# Patient Record
Sex: FEMALE | Race: BLACK OR AFRICAN AMERICAN | Employment: FULL TIME | URBAN - METROPOLITAN AREA
[De-identification: names, ages, dates, MRNs, and addresses within clinical notes are randomized per-mention and may not be internally consistent; named-entity substitution may affect disease eponyms.]

---

## 2017-02-09 ENCOUNTER — OFFICE VISIT (OUTPATIENT)
Dept: FAMILY MEDICINE CLINIC | Age: 62
End: 2017-02-09

## 2017-02-09 VITALS
OXYGEN SATURATION: 98 % | DIASTOLIC BLOOD PRESSURE: 87 MMHG | HEART RATE: 94 BPM | BODY MASS INDEX: 31.55 KG/M2 | WEIGHT: 201 LBS | RESPIRATION RATE: 20 BRPM | TEMPERATURE: 100.1 F | SYSTOLIC BLOOD PRESSURE: 143 MMHG | HEIGHT: 67 IN

## 2017-02-09 DIAGNOSIS — R68.83 CHILLS: ICD-10-CM

## 2017-02-09 DIAGNOSIS — R50.9 FEVER, UNSPECIFIED FEVER CAUSE: ICD-10-CM

## 2017-02-09 DIAGNOSIS — R05.9 COUGH: ICD-10-CM

## 2017-02-09 DIAGNOSIS — J02.9 SORE THROAT: ICD-10-CM

## 2017-02-09 DIAGNOSIS — J11.1 INFLUENZA: Primary | ICD-10-CM

## 2017-02-09 DIAGNOSIS — J30.9 ALLERGIC RHINITIS, UNSPECIFIED ALLERGIC RHINITIS TRIGGER, UNSPECIFIED RHINITIS SEASONALITY: ICD-10-CM

## 2017-02-09 LAB
QUICKVUE INFLUENZA TEST: POSITIVE
S PYO AG THROAT QL: NEGATIVE
VALID INTERNAL CONTROL?: YES
VALID INTERNAL CONTROL?: YES

## 2017-02-09 RX ORDER — OSELTAMIVIR PHOSPHATE 75 MG/1
75 CAPSULE ORAL 2 TIMES DAILY
Qty: 10 CAP | Refills: 0 | Status: SHIPPED | OUTPATIENT
Start: 2017-02-09 | End: 2017-02-14

## 2017-02-09 RX ORDER — FLUTICASONE PROPIONATE 50 MCG
2 SPRAY, SUSPENSION (ML) NASAL DAILY
Qty: 1 BOTTLE | Refills: 5 | Status: SHIPPED | OUTPATIENT
Start: 2017-02-09

## 2017-02-09 NOTE — LETTER
NOTIFICATION RETURN TO WORK / SCHOOL 
 
2/9/2017 1:16 PM 
 
Ms. Tristan Yap [address] [place] [address] To Whom It May Concern: 
 
Tristan Crooks is currently under the care of Shannan Zamorano. She will return to work/school on: 02-14-17 If there are questions or concerns please have the patient contact our office. Sincerely, Tessy Vanegas MD

## 2017-02-09 NOTE — PROGRESS NOTES
Chief Complaint   Patient presents with    Cough     x 2 days    Sore Throat    Chills    Fever    Generalized Body Aches     HISTORY OF PRESENT ILLNESS  Adolfo Primrose is a 63 y.o. female. HPI  Pt here for eval of 2 day hx of feeling poorly. She awoke with sore throat yesterday, then began to have body aches last night. She has been having chills as well. Pt requests med refill today. Review of Systems   Constitutional: Positive for chills. HENT: Positive for sore throat. Respiratory: Negative. Cardiovascular: Negative. Musculoskeletal: Positive for myalgias. All other systems reviewed and are negative. Physical Exam   Constitutional: She is oriented to person, place, and time. She appears well-developed and well-nourished. She appears ill. No distress. HENT:   Head: Normocephalic and atraumatic. Right Ear: Hearing, tympanic membrane, external ear and ear canal normal.   Left Ear: Hearing, tympanic membrane, external ear and ear canal normal.   Nose: Mucosal edema and rhinorrhea present. Right sinus exhibits no maxillary sinus tenderness and no frontal sinus tenderness. Left sinus exhibits no maxillary sinus tenderness and no frontal sinus tenderness. Mouth/Throat: Uvula is midline, oropharynx is clear and moist and mucous membranes are normal.   Eyes: Conjunctivae and EOM are normal.   Neck: Normal range of motion. Neck supple. No JVD present. No thyromegaly present. Cardiovascular: Normal rate, regular rhythm and normal heart sounds. Exam reveals no gallop and no friction rub. No murmur heard. Pulmonary/Chest: Effort normal and breath sounds normal. She has no wheezes. She has no rhonchi. She has no rales. Musculoskeletal: Normal range of motion. Lymphadenopathy:     She has no cervical adenopathy. Neurological: She is alert and oriented to person, place, and time. Coordination normal.   Skin: Skin is warm and dry.    Psychiatric: She has a normal mood and affect. Her behavior is normal. Judgment and thought content normal.   Nursing note and vitals reviewed. Results for Ivet Farris (MRN [id number]) as of 2/12/2017 19:37   Ref. Range 2/9/2017 12:30   Group A Strep Ag Latest Ref Range: Negative  Negative   QuickVue Influenza test Latest Ref Range: Negative  Positive   ASSESSMENT and PLAN  Kathleen Player was seen today for cough, sore throat, chills, fever and generalized body aches. Diagnoses and all orders for this visit:    Influenza  -     oseltamivir (TAMIFLU) 75 mg capsule; Take 1 Cap by mouth two (2) times a day for 5 days. Rest, hydrate. Work note given. Allergic rhinitis, unspecified allergic rhinitis trigger, unspecified rhinitis seasonality  -     fluticasone (FLONASE) 50 mcg/actuation nasal spray; 2 Sprays by Both Nostrils route daily.     Cough  -     AMB POC RAPID STREP A  -     AMB POC RAPID INFLUENZA TEST    Chills  -     AMB POC RAPID STREP A  -     AMB POC RAPID INFLUENZA TEST    Fever, unspecified fever cause  -     AMB POC RAPID STREP A  -     AMB POC RAPID INFLUENZA TEST    Sore throat  -     AMB POC RAPID STREP A  -     AMB POC RAPID INFLUENZA TEST      Follow-up Disposition: prn

## 2017-02-09 NOTE — PROGRESS NOTES
Chief Complaint   Patient presents with    Cough     x 2 days    Headache    Nasal Congestion    Sore Throat    Chills    Fever    Generalized Body Aches

## 2017-02-13 ENCOUNTER — OFFICE VISIT (OUTPATIENT)
Dept: FAMILY MEDICINE CLINIC | Age: 62
End: 2017-02-13

## 2017-02-13 VITALS
HEART RATE: 75 BPM | OXYGEN SATURATION: 98 % | DIASTOLIC BLOOD PRESSURE: 89 MMHG | TEMPERATURE: 98.5 F | WEIGHT: 200.5 LBS | BODY MASS INDEX: 31.47 KG/M2 | SYSTOLIC BLOOD PRESSURE: 132 MMHG | HEIGHT: 67 IN | RESPIRATION RATE: 17 BRPM

## 2017-02-13 DIAGNOSIS — R05.9 COUGH: Primary | ICD-10-CM

## 2017-02-13 RX ORDER — HYDROCODONE POLISTIREX AND CHLORPHENIRAMINE POLISTIREX 10; 8 MG/5ML; MG/5ML
1 SUSPENSION, EXTENDED RELEASE ORAL
Qty: 200 ML | Refills: 0 | Status: SHIPPED | OUTPATIENT
Start: 2017-02-13 | End: 2017-02-22 | Stop reason: ALTCHOICE

## 2017-02-13 RX ORDER — METHYLPREDNISOLONE 4 MG/1
TABLET ORAL
Qty: 1 DOSE PACK | Refills: 0 | Status: SHIPPED | OUTPATIENT
Start: 2017-02-13 | End: 2017-06-12 | Stop reason: ALTCHOICE

## 2017-02-13 NOTE — PROGRESS NOTES
Chief Complaint   Patient presents with    Cough     states that she was diagnosed with the flu last week and that the cough has persisted, has taken robitussin and no relief from symptoms     [address] Far[address]aneh Client is a 63 y.o. female. HPI  Pt here for f/u of cough. She feels that her flu sx have improved somewhat. She is not having body aches or chills. She does not have any ha or face pain. She is not wheezing or having shortness of breath. She does have pnd. The cough is worse when she tries to lay down to sleep. This is keeping her from being able to sleep comfortably. Review of Systems   Constitutional: Negative. Negative for chills and fever. HENT: Negative. Respiratory: Positive for cough and sputum production. Negative for shortness of breath and wheezing. Cardiovascular: Negative. Musculoskeletal: Negative for myalgias. Neurological: Negative for headaches. All other systems reviewed and are negative. Physical Exam   Constitutional: She is oriented to person, place, and time. She appears well-developed and well-nourished. HENT:   Head: Normocephalic and atraumatic. Right Ear: Hearing, tympanic membrane, external ear and ear canal normal.   Left Ear: Hearing, tympanic membrane, external ear and ear canal normal.   Nose: Mucosal edema present. No rhinorrhea. Right sinus exhibits no maxillary sinus tenderness and no frontal sinus tenderness. Left sinus exhibits no maxillary sinus tenderness and no frontal sinus tenderness. Mouth/Throat: Uvula is midline, oropharynx is clear and moist and mucous membranes are normal.   Eyes: Conjunctivae and EOM are normal.   Neck: Normal range of motion. Neck supple. No JVD present. No thyromegaly present. Cardiovascular: Normal rate, regular rhythm and normal heart sounds. Exam reveals no gallop and no friction rub. No murmur heard.   Pulmonary/Chest: Effort normal and breath sounds normal. No accessory muscle usage. No respiratory distress. She has no decreased breath sounds. She has no wheezes. She has no rhonchi. She has no rales. Musculoskeletal: Normal range of motion. Lymphadenopathy:     She has cervical adenopathy. Neurological: She is alert and oriented to person, place, and time. Coordination normal.   Skin: Skin is warm and dry. Psychiatric: She has a normal mood and affect. Her behavior is normal. Judgment and thought content normal.   Nursing note and vitals reviewed. ASSESSMENT and PLAN  Tianna James was seen today for cough. Diagnoses and all orders for this visit:    Cough  -     methylPREDNISolone (MEDROL DOSEPACK) 4 mg tablet; Use as directed. -     chlorpheniramine-HYDROcodone (TUSSIONEX) 10-8 mg/5 mL suspension; Take 5 mL by mouth every twelve (12) hours as needed for Cough. Max Daily Amount: 10 mL.       Follow-up Disposition: prn

## 2017-02-13 NOTE — PROGRESS NOTES
Chief Complaint   Patient presents with    Cough     states that she was diagnosed with the flu last week and that the cough has persisted, has taken robitussin and no relief from symptoms     1. Have you been to the ER, urgent care clinic since your last visit? Hospitalized since your last visit? No    2. Have you seen or consulted any other health care providers outside of the [address] [place] [address] since your last visit? Include any pap smears or colon screening.  No

## 2017-02-16 ENCOUNTER — OFFICE VISIT (OUTPATIENT)
Dept: FAMILY MEDICINE CLINIC | Age: 62
End: 2017-02-16

## 2017-02-16 VITALS
HEART RATE: 65 BPM | BODY MASS INDEX: 31.71 KG/M2 | HEIGHT: 67 IN | SYSTOLIC BLOOD PRESSURE: 123 MMHG | WEIGHT: 202 LBS | TEMPERATURE: 98.5 F | RESPIRATION RATE: 18 BRPM | DIASTOLIC BLOOD PRESSURE: 82 MMHG | OXYGEN SATURATION: 96 %

## 2017-02-16 DIAGNOSIS — Z00.00 WELL ADULT EXAM: Primary | ICD-10-CM

## 2017-02-16 DIAGNOSIS — E78.2 MIXED HYPERLIPIDEMIA: ICD-10-CM

## 2017-02-16 NOTE — MR AVS SNAPSHOT
Visit Information Date & Time Provider Department Dept. Phone Encounter #  
 2/16/2017 11:00 AM Natalie Pastor MD [address] [id number] Your Appointments 5/25/2017 10:00 AM  
Follow Up with Natalie Pastor MD  
[address] (--) Appt Note: Follow Up  
 [place] [id number] [address] [address] [phone]  
  
   
 [address] [address] [address] [address] Upcoming Health Maintenance Date Due Hepatitis C Screening 1955 DTaP/Tdap/Td series (1 - Tdap) 8/14/1978 BREAST CANCER SCRN MAMMOGRAM 6/30/2016 INFLUENZA AGE 9 TO ADULT 8/1/2016 COLONOSCOPY 12/30/2017 Allergies as of 2/16/2017  Review Complete On: 2/16/2017 By: Natalie Pastor MD  
  
 Severity Noted Reaction Type Reactions Sulfa (Sulfonamide Antibiotics)  07/22/2015    Rash Sulfur  02/25/2013    Rash Current Immunizations  Never Reviewed Name Date Influenza Vaccine 10/20/2015 Influenza Vaccine PF 12/16/2014 Not reviewed this visit Vitals BP Pulse Temp Resp Height(growth percentile) Weight(growth percentile) 123/82 (BP 1 Location: Left arm, BP Patient Position: Sitting) 65 98.5 °F (36.9 °C) (Oral) 18 5' 7\" (1.702 m) 202 lb (91.6 kg) SpO2 BMI OB Status Smoking Status 96% 31.64 kg/m2 Hysterectomy Never Smoker BMI and BSA Data Body Mass Index Body Surface Area  
 31.64 kg/m 2 2.08 m 2 Preferred Pharmacy Pharmacy Name Phone [phone] - [address], [address] AT [address] [phone] Your Updated Medication List  
  
   
This list is accurate as of: 2/16/17 12:46 PM.  Always use your most recent med list.  
  
  
  
  
 chlorpheniramine-HYDROcodone 10-8 mg/5 mL suspension Commonly known as:  Elma Cadet Take 5 mL by mouth every twelve (12) hours as needed for Cough. Max Daily Amount: 10 mL. fluticasone 50 mcg/actuation nasal spray Commonly known as:  Filbert Chard 2 Sprays by Both Nostrils route daily. loratadine 10 mg tablet Commonly known as:  CLARITIN  
TAKE 1 TABLET BY MOUTH EVERY DAY  
  
 methylPREDNISolone 4 mg tablet Commonly known as:  Elner Setting Use as directed. montelukast 10 mg tablet Commonly known as:  SINGULAIR Take 1 Tab by mouth daily. triamcinolone acetonide 0.1 % topical cream  
Commonly known as:  KENALOG Apply  to affected area two (2) times a day. valACYclovir 500 mg tablet Commonly known as:  VALTREX Patient Instructions Please contact our office if you have any questions about your visit today. Introducing \[facility] [place] [facility]\"" & HEALTH SERVICES! Dear Harsh July: 
Thank you for requesting a [employer] account. Our records indicate that you already have an active [employer] account. You can access your account anytime at [email or web address] Did you know that you can access your hospital and ER discharge instructions at any time in [employer]? You can also review all of your test results from your hospital stay or ER visit. Additional Information If you have questions, please visit the Frequently Asked Questions section of the [employer] website at [email or web address] Remember, [employer] is NOT to be used for urgent needs. For medical emergencies, dial 911. Now available from your iPhone and Android! Please provide this summary of care documentation to your next provider. Your primary care clinician is listed as Wendi Mcnulty. If you have any questions after today's visit, please call [phone].

## 2017-02-16 NOTE — PROGRESS NOTES
Subjective:   63 y.o. female for Well Woman Check. Her gyne and breast care is done elsewhere by her Ob-Gyne physician. Patient Active Problem List   Diagnosis Code    Anxiety F41.9    AR (allergic rhinitis) J30.9    Irritable bowel syndrome K58.9    Venous (peripheral) insufficiency I87.2    Mixed hyperlipidemia E78.2     Current Outpatient Prescriptions   Medication Sig Dispense Refill    methylPREDNISolone (MEDROL DOSEPACK) 4 mg tablet Use as directed. 1 Dose Pack 0    chlorpheniramine-HYDROcodone (TUSSIONEX) 10-8 mg/5 mL suspension Take 5 mL by mouth every twelve (12) hours as needed for Cough. Max Daily Amount: 10 mL. 200 mL 0    fluticasone (FLONASE) 50 mcg/actuation nasal spray 2 Sprays by Both Nostrils route daily. 1 Bottle 5    loratadine (CLARITIN) 10 mg tablet TAKE 1 TABLET BY MOUTH EVERY DAY 30 Tab 12    valACYclovir (VALTREX) 500 mg tablet   1    montelukast (SINGULAIR) 10 mg tablet Take 1 Tab by mouth daily. 30 Tab 5    triamcinolone acetonide (KENALOG) 0.1 % topical cream Apply  to affected area two (2) times a day.  60 g 1     Allergies   Allergen Reactions    Sulfa (Sulfonamide Antibiotics) Rash    Sulfur Rash     Past Medical History   Diagnosis Date    Irritable bowel syndrome     Mixed hyperlipidemia     Urge incontinence     Venous (peripheral) insufficiency               Past Surgical History   Procedure Laterality Date    Hx hysterectomy      Hx other surgical       hx cystoscopy    Hx colonoscopy  6/2009     Family History   Problem Relation Age of Onset    Hypertension Mother     Alzheimer Father     Diabetes Brother      Social History   Substance Use Topics    Smoking status: Never Smoker    Smokeless tobacco: Never Used    Alcohol use 0.0 oz/week     0 Standard drinks or equivalent per week      Comment: socially        Lab Results   Component Value Date/Time    Cholesterol, total 240 11/01/2016 11:50 AM    HDL Cholesterol 75 11/01/2016 11:50 AM    LDL, calculated 146 11/01/2016 11:50 AM    Triglyceride 94 11/01/2016 11:50 AM       Lab Results   Component Value Date/Time    Sodium 143 11/01/2016 11:50 AM    Potassium 4.6 11/01/2016 11:50 AM    Chloride 100 11/01/2016 11:50 AM    CO2 28 11/01/2016 11:50 AM    Anion gap 15.0 11/01/2016 11:50 AM    Glucose 95 11/01/2016 11:50 AM    BUN 11 11/01/2016 11:50 AM    Creatinine 0.7 11/01/2016 11:50 AM    Calcium 9.6 11/01/2016 11:50 AM    Bilirubin, total 0.3 11/01/2016 11:50 AM    ALT (SGPT) 9 11/01/2016 11:50 AM    AST (SGOT) 16 11/01/2016 11:50 AM    Alk. phosphatase 83 11/01/2016 11:50 AM    Protein, total 7.2 11/01/2016 11:50 AM    Albumin 4.1 11/01/2016 11:50 AM    Globulin 3.1 11/01/2016 11:50 AM    A-G Ratio 1.3 11/01/2016 11:50 AM         ROS: Feeling generally well. No TIA's or unusual headaches, no dysphagia. No prolonged cough. No dyspnea or chest pain on exertion. No abdominal pain, change in bowel habits, black or bloody stools. No urinary tract symptoms. No new or unusual musculoskeletal symptoms. Specific concerns today: pt would like more info on med to help with wt loss. Objective: The patient appears well, alert, oriented x 3, in no distress. Visit Vitals    /82 (BP 1 Location: Left arm, BP Patient Position: Sitting)    Pulse 65    Temp 98.5 °F (36.9 °C) (Oral)    Resp 18    Ht 5' 7\" (1.702 m)    Wt 202 lb (91.6 kg)    SpO2 96%    BMI 31.64 kg/m2     General:  Alert, cooperative, no distress, appears stated age. Head:  Normocephalic, without obvious abnormality, atraumatic. Eyes:  Conjunctivae/corneas clear. PERRL, EOMs intact. Fundi benign. Ears:  Normal TMs and external ear canals both ears. Nose: Nares normal. Septum midline. Mucosa normal. No drainage or sinus tenderness.    Throat: Lips, mucosa, and tongue normal. Teeth and gums normal.   Neck: Supple, symmetrical, trachea midline, no adenopathy, thyroid: no enlargement/tenderness/nodules, no carotid bruit and no JVD.   Back:   Symmetric, no curvature. ROM normal. No CVA tenderness. Lungs:   Clear to auscultation bilaterally. Chest wall:  No tenderness or deformity. Heart:  Regular rate and rhythm, S1, S2 normal, no murmur, click, rub or gallop. Abdomen:   Soft, non-tender. Bowel sounds normal. No masses,  No organomegaly. Extremities: Extremities normal, atraumatic, no cyanosis or edema. Pulses: 2+ and symmetric all extremities. Skin: Skin color, texture, turgor normal. No rashes or lesions. Lymph nodes: Cervical and supraclavicular nodes normal.   Neurologic: CNII-XII intact. Normal strength, sensation and reflexes throughout. Assessment/Plan:   Well Woman  increase physical activity, follow low fat diet, have labs drawn prior to [facility] was seen today for complete physical.    Diagnoses and all orders for this visit:    Well adult exam  Form completed and returned to pt. Mixed hyperlipidemia  -     METABOLIC PANEL, COMPREHENSIVE; Future  -     LIPID PANEL; Future  Handout on heart healthy diet given. BMI 31.0-31.9,adult  Long discussion about use of phentermine as part of a weight loss program, along with diet and regular exercise. Also discussed MSW[facility]. Info given for MSW[facility].       Follow-up Disposition: 3 months for hyperlipidemia; sooner prn

## 2017-02-16 NOTE — PROGRESS NOTES
Yola Garcia 63 y.o. female   Chief Complaint   Patient presents with    Complete Physical     Wellness form to complete         1. Have you been to the ER, urgent care clinic since your last visit? Hospitalized since your last visit? No    2. Have you seen or consulted any other health care providers outside of the [address] [place] [address] since your last visit? Include any pap smears or colon screening.  No

## 2017-02-20 ENCOUNTER — TELEPHONE (OUTPATIENT)
Dept: FAMILY MEDICINE CLINIC | Age: 62
End: 2017-02-20

## 2017-02-20 NOTE — TELEPHONE ENCOUNTER
Pt has the flu and is still coughing. She wants to know if something else can be called in to [facility].

## 2017-02-22 ENCOUNTER — OFFICE VISIT (OUTPATIENT)
Dept: FAMILY MEDICINE CLINIC | Age: 62
End: 2017-02-22

## 2017-02-22 VITALS
HEIGHT: 67 IN | TEMPERATURE: 97.8 F | OXYGEN SATURATION: 98 % | BODY MASS INDEX: 31.71 KG/M2 | DIASTOLIC BLOOD PRESSURE: 83 MMHG | RESPIRATION RATE: 18 BRPM | HEART RATE: 81 BPM | SYSTOLIC BLOOD PRESSURE: 123 MMHG | WEIGHT: 202 LBS

## 2017-02-22 DIAGNOSIS — J30.9 ALLERGIC RHINITIS, UNSPECIFIED ALLERGIC RHINITIS TRIGGER, UNSPECIFIED RHINITIS SEASONALITY: Primary | ICD-10-CM

## 2017-02-22 DIAGNOSIS — R05.8 POST-VIRAL COUGH SYNDROME: ICD-10-CM

## 2017-02-22 RX ORDER — BENZONATATE 200 MG/1
200 CAPSULE ORAL
Qty: 30 CAP | Refills: 0 | Status: SHIPPED | OUTPATIENT
Start: 2017-02-22 | End: 2017-06-12 | Stop reason: ALTCHOICE

## 2017-02-22 RX ORDER — MONTELUKAST SODIUM 10 MG/1
10 TABLET ORAL DAILY
Qty: 30 TAB | Refills: 12 | Status: SHIPPED | OUTPATIENT
Start: 2017-02-22 | End: 2019-10-09

## 2017-02-22 RX ORDER — LORATADINE 10 MG/1
TABLET ORAL
Qty: 30 TAB | Refills: 12 | Status: SHIPPED | OUTPATIENT
Start: 2017-02-22

## 2017-02-22 NOTE — PROGRESS NOTES
HISTORY OF PRESENT ILLNESS  Jorge Mendoza is a 63 y.o. female. Chief Complaint   Patient presents with    Cough     Pt reports minor improvement and has completed medication given at last visit       HPI  Pt is here for a follow up for her cough. Pt states that she has had some improvement but states that the cough is still present. Pt is concerned that this may be related to her sinuses. At times, she has pnd. She has been taking her claritin and flonase. These do not seem to be helping. She still had cough when she took the tussionex. Review of Systems   Constitutional: Negative. Negative for chills and fever. HENT: Positive for congestion. Respiratory: Positive for cough and sputum production. Negative for shortness of breath and wheezing. Cardiovascular: Negative. Musculoskeletal: Negative for myalgias. Neurological: Negative for headaches. All other systems reviewed and are negative. Physical Exam   Constitutional: She is oriented to person, place, and time. She appears well-developed and well-nourished. No distress. HENT:   Head: Normocephalic and atraumatic. Right Ear: Hearing, tympanic membrane, external ear and ear canal normal.   Left Ear: Hearing, tympanic membrane, external ear and ear canal normal.   Nose: Mucosal edema present. No rhinorrhea. Mouth/Throat: Uvula is midline, oropharynx is clear and moist and mucous membranes are normal.   Eyes: Conjunctivae and EOM are normal.   Neck: Normal range of motion. Neck supple. No JVD present. No thyromegaly present. Cardiovascular: Normal rate, regular rhythm and normal heart sounds. Exam reveals no gallop and no friction rub. No murmur heard. Pulmonary/Chest: Effort normal and breath sounds normal. No accessory muscle usage. No tachypnea. No respiratory distress. She has no decreased breath sounds. She has no wheezes. She has no rhonchi. She has no rales. Musculoskeletal: Normal range of motion. Lymphadenopathy:     She has no cervical adenopathy. Neurological: She is alert and oriented to person, place, and time. Coordination normal.   Skin: Skin is warm and dry. Psychiatric: She has a normal mood and affect. Her behavior is normal. Judgment and thought content normal.   Nursing note and vitals reviewed. ASSESSMENT and PLAN  Harsh Huang was seen today for cough. Diagnoses and all orders for this visit:    Allergic rhinitis, unspecified allergic rhinitis trigger, unspecified rhinitis seasonality  -     montelukast (SINGULAIR) 10 mg tablet; Take 1 Tab by mouth daily. -     loratadine (CLARITIN) 10 mg tablet; TAKE 1 TABLET BY MOUTH EVERY DAY  Increase flonase to 2 sprays in each nostril daily. Post-viral cough syndrome  -     benzonatate (TESSALON) 200 mg capsule; Take 1 Cap by mouth three (3) times daily as needed for Cough.       Follow-up Disposition: prn

## 2017-02-22 NOTE — PROGRESS NOTES
Gulshan [facility] 63 y.o. female   Chief Complaint   Patient presents with    Cough     Pt reports minor improvement and has completed medication given at last visit         1. Have you been to the ER, urgent care clinic since your last visit? Hospitalized since your last visit? No    2. Have you seen or consulted any other health care providers outside of the [address] [place] [address] since your last visit? Include any pap smears or colon screening.  No

## 2017-02-22 NOTE — LETTER
NOTIFICATION RETURN TO WORK / SCHOOL 
 
2/22/2017 9:41 AM 
 
Ms. Raad Yap [address] [place] [address] To Whom It May Concern: 
 
Raad Godoy is currently under the care of Shannan Zamorano from 2/23/17-2/24/17. She will return to work/school on: 2/28/17. If there are questions or concerns please have the patient contact our office. Sincerely, Jacklyn White MD

## 2017-02-22 NOTE — TELEPHONE ENCOUNTER
Spoke with patient on 2-+21-17 she states that she was not feeling better. Pt was scheduled for an appt.

## 2017-05-17 LAB
A-G RATIO,AGRAT: 1.5 RATIO (ref 1.1–2.6)
ALBUMIN SERPL-MCNC: 4.1 G/DL (ref 3.5–5)
ALP SERPL-CCNC: 76 U/L (ref 25–115)
ALT SERPL-CCNC: 10 U/L (ref 5–40)
ANION GAP SERPL CALC-SCNC: 17 MMOL/L
AST SERPL W P-5'-P-CCNC: 11 U/L (ref 10–37)
BILIRUB SERPL-MCNC: 0.2 MG/DL (ref 0.2–1.2)
BUN SERPL-MCNC: 11 MG/DL (ref 6–22)
CALCIUM SERPL-MCNC: 9.5 MG/DL (ref 8.4–10.5)
CHLORIDE SERPL-SCNC: 99 MMOL/L (ref 98–110)
CHOLEST SERPL-MCNC: 230 MG/DL (ref 110–200)
CO2 SERPL-SCNC: 26 MMOL/L (ref 20–32)
CREAT SERPL-MCNC: 0.7 MG/DL (ref 0.5–1.2)
GFRAA, 66117: >60
GFRNA, 66118: >60
GLOBULIN,GLOB: 2.8 G/DL (ref 2–4)
GLUCOSE SERPL-MCNC: 104 MG/DL (ref 65–99)
HDLC SERPL-MCNC: 72 MG/DL (ref 40–59)
LDLC SERPL CALC-MCNC: 142 MG/DL (ref 50–99)
POTASSIUM SERPL-SCNC: 4.8 MMOL/L (ref 3.5–5.5)
PROT SERPL-MCNC: 6.9 G/DL (ref 6.4–8.3)
SODIUM SERPL-SCNC: 142 MMOL/L (ref 133–145)
TRIGL SERPL-MCNC: 77 MG/DL (ref 40–149)
VLDLC SERPL CALC-MCNC: 15 MG/DL (ref 8–30)

## 2017-06-12 ENCOUNTER — OFFICE VISIT (OUTPATIENT)
Dept: FAMILY MEDICINE CLINIC | Age: 62
End: 2017-06-12

## 2017-06-12 VITALS
SYSTOLIC BLOOD PRESSURE: 128 MMHG | RESPIRATION RATE: 18 BRPM | TEMPERATURE: 98.4 F | DIASTOLIC BLOOD PRESSURE: 78 MMHG | OXYGEN SATURATION: 100 % | BODY MASS INDEX: 31.71 KG/M2 | HEART RATE: 82 BPM | WEIGHT: 202 LBS | HEIGHT: 67 IN

## 2017-06-12 DIAGNOSIS — E78.2 MIXED HYPERLIPIDEMIA: ICD-10-CM

## 2017-06-12 DIAGNOSIS — Z11.59 ENCOUNTER FOR HEPATITIS C SCREENING TEST FOR LOW RISK PATIENT: ICD-10-CM

## 2017-06-12 DIAGNOSIS — E78.2 MIXED HYPERLIPIDEMIA: Primary | ICD-10-CM

## 2017-06-12 NOTE — PROGRESS NOTES
HISTORY OF PRESENT ILLNESS  Haris Baltazar is a 63 y.o. female. Chief Complaint   Patient presents with   [address]     completed on 5-16-17       HPI  Pt is here for follow up to review labs. Pt had labs on 5-16-17. Labs reviewed in detail with pt. Pt does not need medication refills today. New concerns today: none      Health Maintenance reviewed - need for hepatitis C screening discussed      ROS  Review of Systems   Constitutional: Negative. HENT: Negative. Respiratory: Negative. Cardiovascular: Negative. All other systems reviewed and are negative. Physical Exam  Physical Exam   Nursing note and vitals reviewed. Constitutional: She is oriented to person, place, and time. She appears well-developed and well-nourished. HENT:   Head: Normocephalic and atraumatic. Right Ear: External ear normal.   Left Ear: External ear normal.   Nose: Nose normal.   Eyes: Conjunctivae and EOM are normal.   Neck: Normal range of motion. Neck supple. No JVD present. Carotid bruit is not present. No thyromegaly present. Cardiovascular: Normal rate, regular rhythm, normal heart sounds and intact distal pulses. Exam reveals no gallop and no friction rub. No murmur heard. Pulmonary/Chest: Effort normal and breath sounds normal. She has no wheezes. She has no rhonchi. She has no rales. Abdominal: Soft. Bowel sounds are normal.   Musculoskeletal: Normal range of motion. Neurological: She is alert and oriented to person, place, and time. Coordination normal.   Skin: Skin is warm and dry. Psychiatric: She has a normal mood and affect. Her behavior is normal. Judgment and thought content normal.     ASSESSMENT and Latoya Baires was seen today for follow-up and labs. Diagnoses and all orders for this visit:    Mixed hyperlipidemia  -     METABOLIC PANEL, COMPREHENSIVE; Future  -     LIPID PANEL;  Future    Encounter for hepatitis C screening test for low risk patient  -     HEPATITIS C AB; Future      Follow-up Disposition: 3 months; sooner prn

## 2017-06-12 NOTE — PROGRESS NOTES
Ruma Roche 63 y.o. female   Chief Complaint   Patient presents with    Follow-up    Labs     completed on 5-16-17         1. Have you been to the ER, urgent care clinic since your last visit? Hospitalized since your last visit? No    2. Have you seen or consulted any other health care providers outside of the [address] [place] [address] since your last visit? Include any pap smears or colon screening.  No

## 2017-11-28 ENCOUNTER — OFFICE VISIT (OUTPATIENT)
Dept: FAMILY MEDICINE CLINIC | Age: 62
End: 2017-11-28

## 2017-11-28 VITALS
DIASTOLIC BLOOD PRESSURE: 79 MMHG | RESPIRATION RATE: 18 BRPM | SYSTOLIC BLOOD PRESSURE: 111 MMHG | HEART RATE: 60 BPM | TEMPERATURE: 97.1 F | WEIGHT: 204 LBS | BODY MASS INDEX: 32.02 KG/M2 | HEIGHT: 67 IN | OXYGEN SATURATION: 99 %

## 2017-11-28 DIAGNOSIS — J06.9 VIRAL UPPER RESPIRATORY TRACT INFECTION: Primary | ICD-10-CM

## 2017-11-28 DIAGNOSIS — Z11.59 ENCOUNTER FOR HEPATITIS C SCREENING TEST FOR LOW RISK PATIENT: ICD-10-CM

## 2017-11-28 DIAGNOSIS — E78.2 MIXED HYPERLIPIDEMIA: ICD-10-CM

## 2017-11-28 DIAGNOSIS — R51.9 HEADACHE, UNSPECIFIED HEADACHE TYPE: ICD-10-CM

## 2017-11-28 DIAGNOSIS — J34.89 NASAL DRAINAGE: ICD-10-CM

## 2017-11-28 LAB
QUICKVUE INFLUENZA TEST: NEGATIVE
S PYO AG THROAT QL: NEGATIVE
VALID INTERNAL CONTROL?: YES
VALID INTERNAL CONTROL?: YES

## 2017-11-28 RX ORDER — AMOXICILLIN AND CLAVULANATE POTASSIUM 875; 125 MG/1; MG/1
1 TABLET, FILM COATED ORAL 2 TIMES DAILY
Qty: 20 TAB | Refills: 0 | Status: SHIPPED | OUTPATIENT
Start: 2017-11-28 | End: 2017-12-08

## 2017-11-28 NOTE — PROGRESS NOTES
Chief Complaint   Patient presents with    Cold Symptoms     Nasal drainage x 3 days Patient denies any fever, chills, or sore throat    Headache     No pain currently. HISTORY OF PRESENT ILLNESS  Paul Calzada is a 63 y.o. female. HPI  Pt here for eval of possible sinusitis. Pt reports that her sx began 2 days ago. She started with coughing. The cough was not productive but resolved over the last day or so. She does have nasal congestion and frontal ha. No fevers or chills. Pt will be travelling soon and was concerned about her sx. No recent labs. Pt would like to plan a routine f/u visit in Jan 2018.     need for hepatitis C screening discussed. Review of Systems   Constitutional: Negative. Negative for chills and fever. HENT: Positive for congestion. Respiratory: Positive for cough. Negative for sputum production, shortness of breath and wheezing. Cardiovascular: Negative. Neurological: Positive for headaches. All other systems reviewed and are negative. Physical Exam   Constitutional: She is oriented to person, place, and time. She appears well-developed and well-nourished. No distress. HENT:   Head: Normocephalic and atraumatic. Right Ear: Hearing, external ear and ear canal normal. A middle ear effusion is present. Left Ear: Hearing, external ear and ear canal normal. A middle ear effusion is present. Nose: Mucosal edema present. Right sinus exhibits no maxillary sinus tenderness and no frontal sinus tenderness. Left sinus exhibits no maxillary sinus tenderness and no frontal sinus tenderness. Mouth/Throat: Uvula is midline, oropharynx is clear and moist and mucous membranes are normal.   Eyes: Conjunctivae and EOM are normal.   Neck: Normal range of motion. Neck supple. No JVD present. No thyromegaly present. Cardiovascular: Normal rate, regular rhythm and normal heart sounds. Exam reveals no gallop and no friction rub.     No murmur heard.  Pulmonary/Chest: Effort normal and breath sounds normal. She has no wheezes. She has no rhonchi. She has no rales. Musculoskeletal: Normal range of motion. Lymphadenopathy:     She has no cervical adenopathy. Neurological: She is alert and oriented to person, place, and time. Coordination normal.   Skin: Skin is warm and dry. Psychiatric: She has a normal mood and affect. Her behavior is normal. Judgment and thought content normal.   Nursing note and vitals reviewed. Recent Results (from the past 12 hour(s))   AMB POC RAPID STREP A    Collection Time: 11/28/17  9:37 AM   Result Value Ref Range    VALID INTERNAL CONTROL POC Yes     Group A Strep Ag Negative Negative   AMB POC RAPID INFLUENZA TEST    Collection Time: 11/28/17  9:49 AM   Result Value Ref Range    VALID INTERNAL CONTROL POC Yes     QuickVue Influenza test Negative Negative       ASSESSMENT and PLAN  Diagnoses and all orders for this visit:    1. Viral upper respiratory tract infection  Pt reassured. Recommend symptomatic treatment as needed. Due to pt's upcoming travel and concerns about possibly having worsening of sx, will send abx e-rx. Pt to hold off on taking this unless she get worse. Pt advised that abx will not speed resolution of viral uri. -     amoxicillin-clavulanate (AUGMENTIN) 875-125 mg per tablet; Take 1 Tab by mouth two (2) times a day for 10 days. 2. Nasal drainage  -     AMB POC RAPID INFLUENZA TEST  -     AMB POC RAPID STREP A    3. Headache, unspecified headache type  -     AMB POC RAPID INFLUENZA TEST  -     AMB POC RAPID STREP A    4. Mixed hyperlipidemia  -     METABOLIC PANEL, COMPREHENSIVE; Future  -     LIPID PANEL; Future    5. Encounter for hepatitis C screening test for low risk patient  -     HEPATITIS C AB;  Future      Follow-up Disposition: 6-8 wks; sooner prn

## 2017-11-28 NOTE — MR AVS SNAPSHOT
Visit Information Date & Time Provider Department Dept. Phone Encounter #  
 11/28/2017  9:30 AM Latonya Arboleda MD [address] [id number] Your Appointments 1/25/2018 10:00 AM  
New Patient with MD [facility] [address]) Appt Note: NP EST CARE PCP NO MAJOR CONCERNS AT THIS TIME  
 [address], [place] [address]  
[phone]  
  
   
 [place] [address] [place] [address]  
  
    
 1/29/2018 10:00 AM  
PHYSICAL with Latonya Arboleda MD  
[address] (--) Appt Note: Physical  
 [place] [address] [id number] [phone]  
  
   
 [place] [address] [address] Upcoming Health Maintenance Date Due Hepatitis C Screening 1955 DTaP/Tdap/Td series (1 - Tdap) 8/14/1978 BREAST CANCER SCRN MAMMOGRAM 6/30/2016 ZOSTER VACCINE AGE 60> 6/14/2017 COLONOSCOPY 12/30/2017 Allergies as of 11/28/2017  Review Complete On: 11/28/2017 By: Latonya Arboleda MD  
  
 Severity Noted Reaction Type Reactions Sulfa (Sulfonamide Antibiotics)  07/22/2015    Rash Sulfur  02/25/2013    Rash Current Immunizations  Never Reviewed Name Date Influenza Vaccine 10/20/2015 Influenza Vaccine PF 12/16/2014 Not reviewed this visit You Were Diagnosed With   
  
 Codes Comments Viral upper respiratory tract infection    -  Primary ICD-10-CM: J06.9, B97.89 ICD-9-CM: 465.9 Nasal drainage     ICD-10-CM: J34.89 ICD-9-CM: 478.19 Headache, unspecified headache type     ICD-10-CM: R51 ICD-9-CM: 784.0 Mixed hyperlipidemia     ICD-10-CM: E78.2 ICD-9-CM: 272.2 Encounter for hepatitis C screening test for low risk patient     ICD-10-CM: Z11.59 
ICD-9-CM: V73.89 Vitals BP Pulse Temp Resp Height(growth percentile) Weight(growth percentile) 111/79 60 97.1 °F (36.2 °C) (Oral) 18 5' 7\" (1.702 m) 204 lb (92.5 kg) SpO2 BMI OB Status Smoking Status 99% 31.95 kg/m2 Hysterectomy Never Smoker BMI and BSA Data Body Mass Index Body Surface Area 31.95 kg/m 2 2.09 m 2 Preferred Pharmacy Pharmacy Name Phone [phone] - [address], [address] [phone] Your Updated Medication List  
  
   
This list is accurate as of: 11/28/17  4:29 PM.  Always use your most recent med list.  
  
  
  
  
 amoxicillin-clavulanate 875-125 mg per tablet Commonly known as:  AUGMENTIN Take 1 Tab by mouth two (2) times a day for 10 days. fluticasone 50 mcg/actuation nasal spray Commonly known as:  Euna Admas 2 Sprays by Both Nostrils route daily. loratadine 10 mg tablet Commonly known as:  CLARITIN  
TAKE 1 TABLET BY MOUTH EVERY DAY  
  
 montelukast 10 mg tablet Commonly known as:  SINGULAIR Take 1 Tab by mouth daily. triamcinolone acetonide 0.1 % topical cream  
Commonly known as:  KENALOG Apply  to affected area two (2) times a day. valACYclovir 500 mg tablet Commonly known as:  VALTREX Prescriptions Sent to Pharmacy Refills  
 amoxicillin-clavulanate (AUGMENTIN) 875-125 mg per tablet 0 Sig: Take 1 Tab by mouth two (2) times a day for 10 days. Class: Normal  
 Pharmacy: [facility] [address] [place] [address] AT [address] Ph #: [phone] Route: Oral  
  
We Performed the Following AMB POC RAPID INFLUENZA TEST [02408 CPT(R)] AMB POC RAPID STREP A [71553 CPT(R)] To-Do List   
 11/28/2017 Lab:  HEPATITIS C AB   
  
 11/28/2017 Lab:  LIPID PANEL   
  
 11/28/2017 Lab:  METABOLIC PANEL, COMPREHENSIVE Patient Instructions Please contact our office if you have any questions about your visit today. Introducing \[facility]\"" & [facility] SERVICES! Dear Zoraida Valenzuela: 
Thank you for requesting a [employer] account. Our records indicate that you already have an active Puls[employer] account. You can access your account anytime at [email or web address] Did you know that you can access your hospital and ER discharge instructions at any time in [employer]? You can also review all of your test results from your hospital stay or ER visit. Additional Information If you have questions, please visit the Frequently Asked Questions section of the P[employer] website at [email or web address] Remember, P[employer] is NOT to be used for urgent needs. For medical emergencies, dial 911. Now available from your iPhone and Android! Please provide this summary of care documentation to your next provider. Your primary care clinician is listed as Evonne Adler. If you have any questions after today's visit, please call [phone].

## 2018-01-29 ENCOUNTER — OFFICE VISIT (OUTPATIENT)
Dept: FAMILY MEDICINE CLINIC | Age: 63
End: 2018-01-29

## 2018-01-29 VITALS
RESPIRATION RATE: 18 BRPM | SYSTOLIC BLOOD PRESSURE: 130 MMHG | BODY MASS INDEX: 32.65 KG/M2 | WEIGHT: 208 LBS | DIASTOLIC BLOOD PRESSURE: 82 MMHG | HEIGHT: 67 IN | TEMPERATURE: 98.3 F | HEART RATE: 68 BPM | OXYGEN SATURATION: 96 %

## 2018-01-29 DIAGNOSIS — Z00.00 WELL ADULT EXAM: Primary | ICD-10-CM

## 2018-01-29 DIAGNOSIS — L65.9 HAIR LOSS: ICD-10-CM

## 2018-01-29 NOTE — PROGRESS NOTES
Teofilo aBron 63 y.o. female   Chief Complaint   Patient presents with    Complete Physical     Pap done with GYN         1. Have you been to the ER, urgent care clinic since your last visit? Hospitalized since your last visit? No    2. Have you seen or consulted any other health care providers outside of the [address] [place] [address] since your last visit? Include any pap smears or colon screening.  No

## 2018-01-30 NOTE — PROGRESS NOTES
Subjective:   63 y.o. female for Well Woman Check. Her gyne and breast care is done elsewhere by her Ob-Gyne physician. Patient Active Problem List   Diagnosis Code    Anxiety F41.9    AR (allergic rhinitis) J30.9    Irritable bowel syndrome K58.9    Venous (peripheral) insufficiency I87.2    Mixed hyperlipidemia E78.2     Current Outpatient Prescriptions   Medication Sig Dispense Refill    montelukast (SINGULAIR) 10 mg tablet Take 1 Tab by mouth daily. 30 Tab 12    loratadine (CLARITIN) 10 mg tablet TAKE 1 TABLET BY MOUTH EVERY DAY 30 Tab 12    fluticasone (FLONASE) 50 mcg/actuation nasal spray 2 Sprays by Both Nostrils route daily. 1 Bottle 5    valACYclovir (VALTREX) 500 mg tablet   1    triamcinolone acetonide (KENALOG) 0.1 % topical cream Apply  to affected area two (2) times a day. 60 g 1     Allergies   Allergen Reactions    Sulfa (Sulfonamide Antibiotics) Rash    Sulfur Rash     Past Medical History:   Diagnosis Date    Irritable bowel syndrome     Mixed hyperlipidemia     Urge incontinence     Venous (peripheral) insufficiency              Past Surgical History:   Procedure Laterality Date    HX COLONOSCOPY  6/2009    HX HYSTERECTOMY      HX OTHER SURGICAL      hx cystoscopy     Family History   Problem Relation Age of Onset    Hypertension Mother     Alzheimer Father     Diabetes Brother      Social History   Substance Use Topics    Smoking status: Never Smoker    Smokeless tobacco: Never Used    Alcohol use 0.0 oz/week     0 Standard drinks or equivalent per week      Comment: socially          ROS: Feeling generally well. No TIA's or unusual headaches, no dysphagia. No prolonged cough. No dyspnea or chest pain on exertion. No abdominal pain, change in bowel habits, black or bloody stools. No urinary tract symptoms. No new or unusual musculoskeletal symptoms. Specific concerns today: pt is concerned about hair loss. She would like to have labs to evaluate this. Objective: The patient appears well, alert, oriented x 3, in no distress. Visit Vitals    /82    Pulse 68    Temp 98.3 °F (36.8 °C) (Oral)    Resp 18    Ht 5' 7\" (1.702 m)    Wt 208 lb (94.3 kg)    SpO2 96%    BMI 32.58 kg/m2     General:  Alert, cooperative, no distress, appears stated age. Head:  Normocephalic, without obvious abnormality, atraumatic. Eyes:  Conjunctivae/corneas clear. PERRL, EOMs intact. Fundi benign. Ears:  Normal TMs and external ear canals both ears. Nose: Nares normal. Septum midline. Mucosa normal. No drainage or sinus tenderness. Throat: Lips, mucosa, and tongue normal. Teeth and gums normal.   Neck: Supple, symmetrical, trachea midline, no adenopathy, thyroid: no enlargement/tenderness/nodules, no carotid bruit and no JVD. Back:   Symmetric, no curvature. ROM normal. No CVA tenderness. Lungs:   Clear to auscultation bilaterally. Chest wall:  No tenderness or deformity. Heart:  Regular rate and rhythm, S1, S2 normal, no murmur, click, rub or gallop. Abdomen:   Soft, non-tender. Bowel sounds normal. No masses,  No organomegaly. Extremities: Extremities normal, atraumatic, no cyanosis or edema. Pulses: 2+ and symmetric all extremities. Skin: Skin color, texture, turgor normal. No rashes or lesions. Lymph nodes: Cervical and supraclavicular nodes normal.   Neurologic: CNII-XII intact. Normal strength, sensation and reflexes throughout. Assessment/Plan:   Well Woman  routine labs ordered, call if any problems  Diagnoses and all orders for this visit:    1. Well adult exam    2. Hair loss  -     TSH 3RD GENERATION; Future  -     VITAMIN B12 & FOLATE;  Future

## 2018-09-06 PROBLEM — K64.9 HEMORRHOIDS: Status: ACTIVE | Noted: 2018-09-06
